# Patient Record
(demographics unavailable — no encounter records)

---

## 2025-04-01 NOTE — PLAN
[FreeTextEntry1] : Patient will return to the office for complete physical exam and fasting blood work

## 2025-04-01 NOTE — HISTORY OF PRESENT ILLNESS
[FreeTextEntry8] : Patient presents to the office today as a new patient for consult Patient has not been to a doctor in several years Patient does have history of drug abuse with oral pills being close benzos and methamphetamines Patient was seen in rehab in [place] in September and was released in October Patient is currently on Suboxone treatment Patient has not had a physical done in several years refused physical today would like to come back for physical exam

## 2025-04-29 NOTE — HEALTH RISK ASSESSMENT
[Good] : ~his/her~  mood as  good [No falls in past year] : Patient reported no falls in the past year [Little interest or pleasure doing things] : 1) Little interest or pleasure doing things [Feeling down, depressed, or hopeless] : 2) Feeling down, depressed, or hopeless [0] : 2) Feeling down, depressed, or hopeless: Not at all (0) [PHQ-2 Negative - No further assessment needed] : PHQ-2 Negative - No further assessment needed [[id number]] : 0 [Current] : Current [de-identified] : [place]  [HIV test declined] : HIV test declined [Hepatitis C test declined] : Hepatitis C test declined [Change in mental status noted] : No change in mental status noted [Language] : denies difficulty with language [With Family] : lives with family [Single] : single [Fully functional (bathing, dressing, toileting, transferring, walking, feeding)] : Fully functional (bathing, dressing, toileting, transferring, walking, feeding) [Fully functional (using the telephone, shopping, preparing meals, housekeeping, doing laundry, using] : Fully functional and needs no help or supervision to perform IADLs (using the telephone, shopping, preparing meals, housekeeping, doing laundry, using transportation, managing medications and managing finances) [Reports changes in hearing] : Reports no changes in hearing [Reports changes in vision] : Reports no changes in vision [Reports normal functional visual acuity (ie: able to read med bottle)] : Reports normal functional visual acuity [Reports changes in dental health] : Reports no changes in dental health

## 2025-04-29 NOTE — HISTORY OF PRESENT ILLNESS
[de-identified] : Pt presents to the office today for CPE and FBW Pt has been feeling well.  Diet has been fair and patient's been trying to exercise 1 to 2 days/week Patient currently seeing a psychiatrist and was put on Latuda and seeing his therapist weekly Patient still going for meetings and [place] 3 times a week Patient has now been drug-free for greater than 2 months

## 2025-04-29 NOTE — PLAN
[FreeTextEntry1] : FBW done in office today  Drug free for over 2 months Continue to improve diet and exercise

## 2025-04-29 NOTE — PHYSICAL EXAM
[No Acute Distress] : no acute distress [Well Nourished] : well nourished [Well Developed] : well developed [Well-Appearing] : well-appearing [Normal] : no acute distress, well nourished, well developed and well-appearing [Normal Sclera/Conjunctiva] : normal sclera/conjunctiva [PERRL] : pupils equal round and reactive to light [EOMI] : extraocular movements intact [Normal Outer Ear/Nose] : the outer ears and nose were normal in appearance [Normal Oropharynx] : the oropharynx was normal [No JVD] : no jugular venous distention [No Lymphadenopathy] : no lymphadenopathy [Supple] : supple [Thyroid Normal, No Nodules] : the thyroid was normal and there were no nodules present [No Respiratory Distress] : no respiratory distress  [No Accessory Muscle Use] : no accessory muscle use [Clear to Auscultation] : lungs were clear to auscultation bilaterally [Normal Rate] : normal rate  [Regular Rhythm] : with a regular rhythm [Normal S1, S2] : normal S1 and S2 [No Murmur] : no murmur heard [No Carotid Bruits] : no carotid bruits [No Abdominal Bruit] : a ~M bruit was not heard ~T in the abdomen [No Varicosities] : no varicosities [No Edema] : there was no peripheral edema [No Palpable Aorta] : no palpable aorta [No Extremity Clubbing/Cyanosis] : no extremity clubbing/cyanosis [Soft] : abdomen soft [Non Tender] : non-tender [Non-distended] : non-distended [No Masses] : no abdominal mass palpated [No HSM] : no HSM [Normal Bowel Sounds] : normal bowel sounds [Normal Posterior Cervical Nodes] : no posterior cervical lymphadenopathy [Normal Anterior Cervical Nodes] : no anterior cervical lymphadenopathy [No CVA Tenderness] : no CVA  tenderness [No Spinal Tenderness] : no spinal tenderness [No Joint Swelling] : no joint swelling [Grossly Normal Strength/Tone] : grossly normal strength/tone [No Rash] : no rash [Coordination Grossly Intact] : coordination grossly intact [No Focal Deficits] : no focal deficits [Normal Gait] : normal gait [Normal Affect] : the affect was normal [Normal Insight/Judgement] : insight and judgment were intact

## 2025-06-26 NOTE — HISTORY OF PRESENT ILLNESS
[Improved Health] : Improved health [Quality of Life] : Quality of life [Home] : at home, [unfilled] , at the time of the visit. [Medical Office: ([facility])___] : at the medical office located in  [Telehealth (audio & video)] : This visit was provided via telehealth using real-time 2-way audio visual technology. [Verbal consent obtained from patient] : the patient, [unfilled] [FreeTextEntry1] : Jaden is a 21-year-old male with a longstanding history of obesity presents for initial consultation for weight-loss management. Pt reports weight gain over her adult life. Pt has tried various diets and exercise programs with limited long-term success.   Pt is interested in weight loss medications.   Weight Loss Medication Screening: Patient denies uncontrolled blood pressure or any other cardiovascular conditions including but not limited to dysrhythmia, MI, CAD, heart failure, CVA. Patient denies history of pancreatitis or gallbladder conditions including but not limited to cholelithiasis, cholecystitis. Patient denies family or personal history thyroid cancer or MEN 2 syndrome. History of bariatric surgery: none Obesity comorbidities: none(Patient denies history of sleep apnea, hypertension, diabetes, dyslipidemia) Comorbidities improved/resolved: n/a Anti-obesity medication tried: none Obesity medication side effects: n/a.   Starting weight at initial consultation:235 pounds (BMI 36.81)

## 2025-06-26 NOTE — HISTORY OF PRESENT ILLNESS
[Improved Health] : Improved health [Quality of Life] : Quality of life [Home] : at home, [unfilled] , at the time of the visit. [Medical Office: ([facility])___] : at the medical office located in  [Telehealth (audio & video)] : This visit was provided via telehealth using real-time 2-way audio visual technology. [Verbal consent obtained from patient] : the patient, [unfilled] [FreeTextEntry1] : Jaden is a 21-year-old male with a longstanding history of obesity presents for initial consultation for weight-loss management. Pt reports weight gain over her adult life. Pt has tried various diets and exercise programs with limited long-term success.   Pt is interested in weight loss medications.   Weight Loss Medication Screening: Patient denies uncontrolled blood pressure or any other cardiovascular conditions including but not limited to dysrhythmia, MI, CAD, heart failure, CVA. Patient denies history of pancreatitis or gallbladder conditions including but not limited to cholelithiasis, cholecystitis. Patient denies family or personal history thyroid cancer or MEN 2 syndrome. History of bariatric surgery: none Obesity comorbidities: none(Patient denies history of sleep apnea, hypertension, diabetes, dyslipidemia) Comorbidities improved/resolved: n/a Anti-obesity medication tried: none Obesity medication side effects: n/a.   Starting weight at initial consultation:235 pounds (BMI 36.81)

## 2025-06-26 NOTE — ASSESSMENT
[FreeTextEntry1] : I had a lengthy discussion with the patient regarding nutrition, exercise, weight loss medications. Patient qualifies for and interested in weight loss medications.   I emphasized the importance of making healthier food choices including fresh fruits and vegetables, lean meats, and protein sources. I recommended front loading calories, incorporating whole grains, and eliminating fast foods. I also discussed the importance of avoiding fried/fatty foods and foods containing high sugar content including juices/shakes/sodas/desserts.   I also encouraged beginning an exercise program and recommended cardiovascular exercises along with strength training to build lean muscle. I made suggestions on different types of exercises to try.   Patient will work on the following: -Meet with nutritionist Opal love -Eliminate snacks -Focus on eating 3 well-balanced meals during the daytime with appropriate portion size -Cooking fresh meals rather than take out/processed/ready-made foods -Incorporating exercise; walk 8-10k steps daily      I have discussed initiating zepbound therapy for pt. All risks and benefits have been discussed with the patient.   Currently there are no contraindications for the use of zepbound after reviewing pts medical history and labs including personal or family history of thyroid cancer or MEN2. Possible side effects were discussed with the patient including nausea, reflux, constipation, delayed gastric emptying, or pancreatitis.    Pt verbalizes understanding and wishes to proceed with medical therapy. Start zepbound 2.5mg based on authorization and tolerance. Patient educated to call with questions/concerns. All questions answered.   Patient is advised to call after 3rd dose for possible dose escalation and make 2 month follow up appointment.    Patient verbalized understanding of all discussion today.  David Laguerre  [facility]. [place] [id number] Tel: [phone] Fax: [phone]

## 2025-06-26 NOTE — ASSESSMENT
[FreeTextEntry1] : I had a lengthy discussion with the patient regarding nutrition, exercise, weight loss medications. Patient qualifies for and interested in weight loss medications.   I emphasized the importance of making healthier food choices including fresh fruits and vegetables, lean meats, and protein sources. I recommended front loading calories, incorporating whole grains, and eliminating fast foods. I also discussed the importance of avoiding fried/fatty foods and foods containing high sugar content including juices/shakes/sodas/desserts.   I also encouraged beginning an exercise program and recommended cardiovascular exercises along with strength training to build lean muscle. I made suggestions on different types of exercises to try.   Patient will work on the following: -Meet with nutritionist Opal love -Eliminate snacks -Focus on eating 3 well-balanced meals during the daytime with appropriate portion size -Cooking fresh meals rather than take out/processed/ready-made foods -Incorporating exercise; walk 8-10k steps daily      I have discussed initiating zepbound therapy for pt. All risks and benefits have been discussed with the patient.   Currently there are no contraindications for the use of zepbound after reviewing pts medical history and labs including personal or family history of thyroid cancer or MEN2. Possible side effects were discussed with the patient including nausea, reflux, constipation, delayed gastric emptying, or pancreatitis.    Pt verbalizes understanding and wishes to proceed with medical therapy. Start zepbound 2.5mg based on authorization and tolerance. Patient educated to call with questions/concerns. All questions answered.   Patient is advised to call after 3rd dose for possible dose escalation and make 2 month follow up appointment.    Patient verbalized understanding of all discussion today.  David Laguerre  [facility]. [place] [id number] Tel: [phone] Fax: [phone]